# Patient Record
Sex: MALE | Race: OTHER | Employment: FULL TIME | URBAN - METROPOLITAN AREA
[De-identification: names, ages, dates, MRNs, and addresses within clinical notes are randomized per-mention and may not be internally consistent; named-entity substitution may affect disease eponyms.]

---

## 2017-10-03 ENCOUNTER — OFFICE VISIT (OUTPATIENT)
Dept: ORTHOPEDIC SURGERY | Age: 52
End: 2017-10-03

## 2017-10-03 VITALS
HEART RATE: 62 BPM | HEIGHT: 66 IN | DIASTOLIC BLOOD PRESSURE: 90 MMHG | SYSTOLIC BLOOD PRESSURE: 145 MMHG | WEIGHT: 145 LBS | BODY MASS INDEX: 23.3 KG/M2

## 2017-10-03 DIAGNOSIS — M75.41 SHOULDER IMPINGEMENT, RIGHT: Primary | ICD-10-CM

## 2017-10-03 PROCEDURE — 99203 OFFICE O/P NEW LOW 30 MIN: CPT | Performed by: ORTHOPAEDIC SURGERY

## 2017-10-03 NOTE — MR AVS SNAPSHOT
After Visit Summary             Venus Hum   10/3/2017 9:00 AM   Office Visit    Description:  Male : 1965   Provider:  Hayley Metcalf MD   Department:  [facility] and Future Appointments         Below is a list of your follow-up and future appointments. This may not be a complete list as you may have made appointments directly with providers that we are not aware of or your providers may have made some for you. Please call your providers to confirm appointments. It is important to keep your appointments. Please bring your current insurance card, photo ID, co-pay, and all medication bottles to your appointment. If self-pay, payment is expected at the time of service. Your To-Do List     Future Appointments Provider Department Dept Phone    10/24/2017 9:00 AM Hayley Mectalf MD [facility] [phone]    Please arrive 15 minutes prior to appointment time, bring insurance card and photo ID. Information from Your Visit        Department     Name Address Phone Fax    [address] [address] [phone]      Lyric Ioana Were Seen for:         Comments    Shoulder impingement, right   [[id number]]         Vital Signs     Height Weight Body Mass Index Smoking Status          5' 6\" (1.676 m) 145 lb (65.8 kg) 23.4 kg/m2 Never Smoker           Medications and Orders      Allergies           No Known Allergies      We Ordered/Performed the following           Ambulatory referral to Physical Therapy     Scheduling Instructions:    [place] [address]  [address] [facility]  Ph: [phone]    Comments: The patient can be scheduled with any member of the group, including the provider with the first available appointments.           Additional Information        Basic Information     Date Of Birth Sex Race Ethnicity Preferred Language 9. Click Sign Up. You can now view your medical record. Additional Information  If you have questions, please contact the physician practice where you receive care. Remember, [employer] ([employer]) is NOT to be used for urgent needs. For medical emergencies, dial 911. For questions regarding your [employer] account call [phone]. If you have a clinical question, please call your doctor's office.

## 2017-10-03 NOTE — PROGRESS NOTES
12 [facility]  History and Physical      Chief Complaint    Shoulder Pain (NP Right shoulder pain)      History of Present Illness:  Jhonny Saez is a 46 y.o. male Who presents with approximately 8-9 months of right shoulder pain. He denies any previous history of injury to his shoulder. He states that the pain has been coming on and off for several months. He describes pain as sometimes 0 out of 10 sometimes 5-6 out of 10. It is made worse by throwing, serving a tennis ball, any activity where he is to reach overhead. It is made better when he does not use it. He denies any numbness or tingling, or dysesthesias in his digits. He denies any strength weaknesses in his elbow and wrist or hand. He denies any sensation of a pop or any acute episodes of worse pain. Medical History:    Review of Systems   Musculoskeletal: Positive for joint pain. Right Shoulder Pain         Patient's medications, allergies, past medical, surgical, social and family histories were reviewed and updated as appropriate. History reviewed. No pertinent past medical history. Social History     Social History    Marital status: Unknown     Spouse name: N/A    Number of children: N/A    Years of education: N/A     Occupational History    Not on file. Social History Main Topics    Smoking status: Never Smoker    Smokeless tobacco: Never Used    Alcohol use Yes    Drug use: No    Sexual activity: Not on file     Other Topics Concern    Not on file     Social History Narrative    No narrative on file       No Known Allergies  No current outpatient prescriptions on file prior to visit. No current facility-administered medications on file prior to visit. Review of Systems:  Pertinent positives and negatives are noted in HPI above. Review of systems reviewed from Patient History Form, and available in the patient's chart under media tab.      Vital Signs:  Vitals:    10/03/17 0945   BP: (!) 145/90   Pulse:        General Exam:   Constitutional: Patient is adequately groomed with no evidence of malnutrition  DTRs: Deep tendon reflexes are intact  Mental Status: The patient is oriented to time, place and person. The patient's mood and affect are appropriate. Lymphatic: The lymphatic examination bilaterally reveals all areas to be without enlargement or induration. Vascular: Examination reveals no swelling or calf tenderness. Peripheral pulses are palpable and 2+. Neurological: The patient has good coordination. There is no weakness or sensory deficit. Upper Extremity:  Normal bulk and tone, symmetric. Visible Chas deformity on the right with evidence of ruptured long head of the biceps    ROM:     R  L  FF  130  150   EROT  35  45   IROT  L5  T10    PROM: Passive forward flexion is able to achieve equal to contralateral side, internal rotation with significant pain    Nontender to palpation Acromion, acromioclavicular joint, clavicle. Nontender over scapular spine and anterior biceps tendon. .  Tender to palpation Anterior rotator cuff, lateral to the acromion. .    Strength: 5/5 strength testing of deltoid, supraspinatus, infraspinatus, teres minor, and subsacpularis    Stability: negative sulcus sign, no evidence of instability. SILT R/U/M/Ax    Special tests: Positive Rojas and Neer impingement. Negative Yergensen's, negative liftoff, negative belly press, negative empty can. Imaging:     X-ray report that he brought in with him dated August 31 shows no evidence of osseous abnormality, no fracture or subluxation or dislocation. Also no evidence of soft tissue calcinosis no evidence of Hill-Sachs or glenoid injury. MRI was reviewed in detail today. Shows a mild amount of thickening of his coracoacromial ligament with evidence of inflammation and irritation on the bursal side of the rotator cuff.   He does not have a tino rotator cuff tear.  There is a SLAP tear evident. MRI reading does not note the biceps tendon injury. There is no evidence of rotator cuff tear. There is no evidence of osteochondral injury. Impression:  Encounter Diagnosis   Name Primary?  Shoulder impingement, right Yes             Plan:  We discussed pathophysiology and natural history of this with Mr. Annette Kumar at length, using images as well as models. It appears the primary source of his pain is some internal impingement that has resulted in some mild capsulitis due to his avoidance of certain ranges of motion over long period of time. We reviewed his MRI with him and showed him the areas of irritation and impingement. He can continue taking the anti-inflammatory medication which appears to be helping him. We can get him into physical therapy program that we think will significantly improve his rotator cuff strengthening and inflammation and irritation is going on around there. If he fails to respond to physical therapy, we will consider additional interventions including cortisone steroid injection to both his capsule as well as the subacromial space  If he fails to respond to all nonoperative treatment,we can discuss operative options with him. We do not think his biceps auto tenotomy as the source of his pain. If the inflammation under his acromion can improve we think he will get much better.           Antonia Cedillo  Fellow, [address]  Date:    10/3/2017

## 2017-10-04 ENCOUNTER — HOSPITAL ENCOUNTER (OUTPATIENT)
Dept: PHYSICAL THERAPY | Age: 52
Discharge: HOME | End: 2017-10-31
Admitting: ORTHOPAEDIC SURGERY

## 2017-10-04 NOTE — PLAN OF CARE
[facility] [address]  Phone [phone]  Fax [phone]      Physical Therapy Certification    Dear Referring Practitioner: Dr. Anirudh Toney,    We had the pleasure of evaluating the following patient for physical therapy services at [address] [place] [address]. A summary of our findings can be found in the initial assessment below. This includes our plan of care. If you have any questions or concerns regarding these findings, please do not hesitate to contact me at the office phone number checked above. Thank you for the referral.       Physician Signature:_______________________________Date:__________________  By signing above (or electronic signature), therapists plan is approved by physician      Patient: Rolly Camp   : 1965   MRN: [id number]  Referring Physician: Referring Practitioner: Dr. Anirudh Toney      Evaluation Date: 10/4/2017      Medical Diagnosis Information:  Diagnosis: R shoulder impingement - M75.41   Treatment Diagnosis: Decreased ROM; Decreased strength;Decreased endurance;Decreased high-level IADLs; Insurance information: PT Insurance Information: [employer]    Precautions/ Contra-indications: None   Latex Allergy:  [x]NO      []YES  Preferred Language for Healthcare:   [x]English       []other:    SUBJECTIVE: Patient stated complaint: he states his right shoulder has been sore/painful for months. He states it started limited activity over time. He saw his PCP, who gave him an anti-inflammatory. He states it did not help. He states he tried ice and a sports massage therapist about 3 times. He states she helped a little bit, but he didn't exactly trust her. He states he saw his PCP again, who referred him to a MRI and an ortho MD. He saw the ortho MD, who referred pt to PT.      CLOF: He states sleeping on his shoulder, reaching behind his back, and reaching across his body increases pain. He states he has started to avoid certain activity to stop irritating it. He states he is unable to serve tennis ball and throw ball with his children. He is unable to swing a golf club. He has difficulty with reaching up to put dishes away. Relevant Medical History:None   Functional Disability Index: UEFI     Pain Scale: 0/10, 8/10 at worst   Easing factors: anti-inflammatory, none   Provocative factors: listed above     Type: []Constant   [x]Intermittent  []Radiating []Localized []other:     Numbness/Tingling: none     Occupation/School: Banking    Hobbies - tennis, golf, football, baseball, independent strength training     Living Status/Prior Level of Function: Independent with ADLs, IADLs, and sports     OBJECTIVE:     ROM AROM  Comment    L R    Flexion 168 148 + pain     Abduction 183 151 + pain     ER 87 60 + pain    IR 61 40 + pain    Other(cervical)              Strength L R Comment   Flexion 4+/5 4+/5    Abduction 4/5 4-/5 + pain     ER 4/5 4-/5    IR 4+/5 4-/5 + pain     Supraspinatus      Upper Trap      Lower Trap 4-/5 3+/5 + pain    Mid Trap 4/5 4-/5 + min pain     Rhomboids 4+/5 4/5      Special Tests Results/Comment   Rojas-Milad Pos   Neers Neg   Speeds Neg   OBriens Neg   Other      Reflexes/Sensation:    [x]Dermatomes/Myotomes intact    [x]Reflexes equal and normal bilaterally   []Other:     Joint mobility:    []Normal    []Hypo   []Hyper    Palpation: No TTP     Functional Mobility/Transfers: WNL    Posture: WNL    Gait: (include devices/WB status): WNL                       [x] Patient history, allergies, meds reviewed. Medical chart reviewed. See intake form. Review Of Systems (ROS):  [x]Performed Review of systems (Integumentary, CardioPulmonary, Neurological) by intake and observation. Intake form has been scanned into medical record.  Patient has been instructed to contact their primary care physician regarding ROS issues if not restricted  Carrying, Moving and Handling Objects Goal Status (): 0 percent impaired, limited or restricted    ASSESSMENT:   Functional Impairments   []Noted spinal or UE joint hypomobility   []Noted spinal or UE joint hypermobility   [x]Decreased UE functional ROM   [x]Decreased UE functional strength   []Abnormal reflexes/sensation/myotomal/dermatomal deficits   [x]Decreased RC/scapular/core strength and neuromuscular control   []other:      Functional Activity Limitations (from functional questionnaire and intake)   []Reduced ability to tolerate prolonged functional positions   []Reduced ability or difficulty with changes of positions or transfers between positions   []Reduced ability to maintain good posture and demonstrate good body mechanics with sitting, bending, and lifting   [] Reduced ability or tolerance with driving and/or computer work   [x]Reduced ability to sleep   [x]Reduced ability to perform lifting, reaching, carrying tasks   []Reduced ability to tolerate impact through UE   [x]Reduced ability to reach behind back   []Reduced ability to  or hold objects   [x]Reduced ability to throw or toss an object   []other:      Participation Restrictions   [x]Reduced participation in self care activities   [x]Reduced participation in home management activities   []Reduced participation in work activities   []Reduced participation in social activities. [x]Reduced participation in sport / recreational activities. Classification:   []Signs/symptoms consistent with post-surgical status including decreased ROM, strength and function.   []Signs/symptoms consistent with joint sprain/strain   [x]Signs/symptoms consistent with shoulder impingement   []Signs/symptoms consistent with shoulder/elbow/wrist tendinopathy   []Signs/symptoms consistent with Rotator cuff tear   []Signs/symptoms consistent with labral tear   []Signs/symptoms consistent with postural dysfunction    []Signs/symptoms consistent with

## 2017-10-04 NOTE — PROGRESS NOTES
12 [facility]  History and Physical      Chief Complaint    Shoulder Pain (NP Right shoulder pain)      History of Present Illness:  Catarino Walden is a 46 y.o. male Who presents with approximately 8-9 months of right shoulder pain. He denies any previous history of injury to his shoulder. He states that the pain has been coming on and off for several months. He describes pain as sometimes 0 out of 10 sometimes 5-6 out of 10. It is made worse by throwing, serving a tennis ball, any activity where he is to reach overhead. It is made better when he does not use it. He denies any numbness or tingling, or dysesthesias in his digits. He denies any strength weaknesses in his elbow and wrist or hand. He denies any sensation of a pop or any acute episodes of worse pain. Medical History:    Review of Systems   Musculoskeletal: Positive for joint pain. Right Shoulder Pain                   [address] [facility]  History and Physical      Chief Complaint    Shoulder Pain (NP Right shoulder pain)      History of Present Illness:  Catarino Walden is a 46 y.o. male Who presents with approximately 8-9 months of right shoulder pain. He denies any previous history of injury to his shoulder. He states that the pain has been coming on and off for several months. He describes pain as sometimes 0 out of 10 sometimes 5-6 out of 10. It is made worse by throwing, serving a tennis ball, any activity where he is to reach overhead. It is made better when he does not use it. He denies any numbness or tingling, or dysesthesias in his digits. He denies any strength weaknesses in his elbow and wrist or hand. He denies any sensation of a pop or any acute episodes of worse pain. Medical History:    Review of Systems   Musculoskeletal: Positive for joint pain.         Right Shoulder Pain         Patient's medications, allergies, past medical, surgical, social and family histories were reviewed and updated as appropriate. History reviewed. No pertinent past medical history. Social History     Social History    Marital status: Unknown     Spouse name: N/A    Number of children: N/A    Years of education: N/A     Occupational History    Not on file. Social History Main Topics    Smoking status: Never Smoker    Smokeless tobacco: Never Used    Alcohol use Yes    Drug use: No    Sexual activity: Not on file     Other Topics Concern    Not on file     Social History Narrative    No narrative on file       No Known Allergies  No current outpatient prescriptions on file prior to visit. No current facility-administered medications on file prior to visit. Review of Systems:  Pertinent positives and negatives are noted in HPI above. Review of systems reviewed from Patient History Form, and available in the patient's chart under media tab. Vital Signs:  Vitals:    10/03/17 0945   BP: (!) 145/90   Pulse:        General Exam:   Constitutional: Patient is adequately groomed with no evidence of malnutrition  DTRs: Deep tendon reflexes are intact  Mental Status: The patient is oriented to time, place and person. The patient's mood and affect are appropriate. Lymphatic: The lymphatic examination bilaterally reveals all areas to be without enlargement or induration. Vascular: Examination reveals no swelling or calf tenderness. Peripheral pulses are palpable and 2+. Neurological: The patient has good coordination. There is no weakness or sensory deficit. Upper Extremity:  Normal bulk and tone, symmetric.   Visible Chas deformity on the right with evidence of ruptured long head of the biceps    ROM:     R  L  FF  130  150   EROT  35  45   IROT  L5  T10    PROM: Passive forward flexion is able to achieve equal to contralateral side, internal rotation with significant pain    Nontender to palpation Acromion, acromioclavicular joint, clavicle. Nontender over scapular spine and anterior biceps tendon. .  Tender to palpation Anterior rotator cuff, lateral to the acromion. .    Strength: 5/5 strength testing of deltoid, supraspinatus, infraspinatus, teres minor, and subsacpularis    Stability: negative sulcus sign, no evidence of instability. SILT R/U/M/Ax    Special tests: Positive Rojas and Neer impingement. Negative Yergensen's, negative liftoff, negative belly press, negative empty can. Imaging:     X-ray report that he brought in with him dated August 31 shows no evidence of osseous abnormality, no fracture or subluxation or dislocation. Also no evidence of soft tissue calcinosis no evidence of Hill-Sachs or glenoid injury. MRI was reviewed in detail today. Shows a mild amount of thickening of his coracoacromial ligament with evidence of inflammation and irritation on the bursal side of the rotator cuff. He does not have a tino rotator cuff tear. There is a SLAP tear evident. MRI reading does not note the biceps tendon injury. There is no evidence of rotator cuff tear. There is no evidence of osteochondral injury. Impression:  Encounter Diagnosis   Name Primary?  Shoulder impingement, right Yes             Plan:  We discussed pathophysiology and natural history of this with Mr. Noe Farrell at length, using images as well as models. It appears the primary source of his pain is some internal impingement that has resulted in some mild capsulitis due to his avoidance of certain ranges of motion over long period of time. We reviewed his MRI with him and showed him the areas of irritation and impingement. He can continue taking the anti-inflammatory medication which appears to be helping him. We can get him into physical therapy program that we think will significantly improve his rotator cuff strengthening and inflammation and irritation is going on around there.   If he fails to respond to physical therapy, we will consider additional interventions including cortisone steroid injection to both his capsule as well as the subacromial space  If he fails to respond to all nonoperative treatment,we can discuss operative options with him. We do not think his biceps auto tenotomy as the source of his pain. If the inflammation under his acromion can improve we think he will get much better. Grzegorz Rivas  Fellow, [address]  Date:    10/3/2017      Patient's medications, allergies, past medical, surgical, social and family histories were reviewed and updated as appropriate. History reviewed. No pertinent past medical history. Social History     Social History    Marital status: Unknown     Spouse name: N/A    Number of children: N/A    Years of education: N/A     Occupational History    Not on file. Social History Main Topics    Smoking status: Never Smoker    Smokeless tobacco: Never Used    Alcohol use Yes    Drug use: No    Sexual activity: Not on file     Other Topics Concern    Not on file     Social History Narrative    No narrative on file       No Known Allergies  No current outpatient prescriptions on file prior to visit. No current facility-administered medications on file prior to visit. Review of Systems:  Pertinent positives and negatives are noted in HPI above. Review of systems reviewed from Patient History Form, and available in the patient's chart under media tab. Vital Signs:  Vitals:    10/03/17 0945   BP: (!) 145/90   Pulse:        General Exam:   Constitutional: Patient is adequately groomed with no evidence of malnutrition  DTRs: Deep tendon reflexes are intact  Mental Status: The patient is oriented to time, place and person. The patient's mood and affect are appropriate. Lymphatic: The lymphatic examination bilaterally reveals all areas to be without enlargement or induration.   Vascular: source of his pain is some internal impingement that has resulted in some mild capsulitis due to his avoidance of certain ranges of motion over long period of time. We reviewed his MRI with him and showed him the areas of irritation and impingement. He can continue taking the anti-inflammatory medication which appears to be helping him. We can get him into physical therapy program that we think will significantly improve his rotator cuff strengthening and inflammation and irritation is going on around there. If he fails to respond to physical therapy, we will consider additional interventions including cortisone steroid injection to both his capsule as well as the subacromial space  If he fails to respond to all nonoperative treatment,we can discuss operative options with him. We do not think his biceps auto tenotomy as the source of his pain. If the inflammation under his acromion can improve we think he will get much better. Dalia Lea  Fellow, [address]  Date:    10/4/2017          I supervised my sports medicine fellow in the evaluation and development of a treatment plan  for this patient. I personally interviewed the patient and performed a physical examination. In addition, I discussed the patient's condition and treatment options with them. All of their questions were answered. I personally reviewed the patient's pain scale, review of systems, family history, social history, past medical history, allergies and medications. 13 point review of systems was collected today and is filed in the medical record. Greater than 50% of the visit was spent counseling the patient. Jarrett Pratt MD  Sports Medicine, Arthroscopic Knee and Shoulder Surgery    This dictation was performed with a verbal recognition program [facility]) and it was checked for errors.   It is possible that there are still dictated errors within this office note. If so, please bring any errors to my attention for an addendum. All efforts were made to ensure that this office note is accurate.

## 2017-10-06 ENCOUNTER — HOSPITAL ENCOUNTER (OUTPATIENT)
Dept: PHYSICAL THERAPY | Age: 52
Discharge: HOME | End: 2017-10-06
Admitting: ORTHOPAEDIC SURGERY

## 2017-10-06 NOTE — FLOWSHEET NOTE
The [address] [place] [address] Sports [facility], [address] 3360 [facility], [address] [place] [address]  Phone: [phone]   Fax:     [phone]    Physical Therapy Daily Treatment Note  Date:  10/6/2017    Patient Name:  Colby Maya    :  1965  MRN: [id number]  Restrictions/Precautions:    Medical/Treatment Diagnosis Information:  Diagnosis: R shoulder impingement - M75.41  Treatment Diagnosis: Decreased ROM; Decreased strength;Decreased endurance;Decreased high-level IADLs; Insurance/Certification information:  PT Insurance Information: [place]  Physician Information:  Referring Practitioner: Dr. Jacqueline Garcia of care signed (Y/N): Y    Date of Patient follow up with Physician:     G-Code (if applicable):      Date G-Code Applied: 10/4/17   PT G-Codes  Functional Assessment Tool Used: UEFI  Score: 20% limitation  Functional Limitation: Carrying, moving and handling objects  Carrying, Moving and Handling Objects Current Status (): At least 1 percent but less than 20 percent impaired, limited or restricted  Carrying, Moving and Handling Objects Goal Status (): 0 percent impaired, limited or restricted    Progress Note: []  Yes  [x]  No  Next due by: 17      Latex Allergy:  [x]NO      []YES  Preferred Language for Healthcare:   [x]English       []other:    Visit # Insurance Allowable   2 30     Pain level: 0/10 10/6     SUBJECTIVE:  States in the AM he is usually more stiff. He states he felt good leaving here the other day. HEP is going ok, but reports he wants to makes sure he is doing them correctly.  10/6    OBJECTIVE:   Observation:   Test measurements:      RESTRICTIONS/PRECAUTIONS: *None     Exercises/Interventions:   Exercises:  Exercise/Equipment Resistance/Repetitions Other comments   Stretching/PROM     Wand     Table Slides     Sleeper stretch 10 sec x 10  Added 10/   Pulleys 10 sec x 10 flex Added 10/   ER cane 10 sec x 10 supine Added 10/4 Isometrics     Retraction          Weight shift     Flexion Abduction External Rotation Internal Rotation Biceps     Triceps          PRE's     Flexion     Abduction     External Rotation     Internal Rotation     Shrugs     EXT     Reverse Flys     Serratus 10 x 3 #3  Added 10/6   Horizontal Abd with ER     Biceps     Triceps     Retraction          Cable Column/Theraband     External Rotation 10 x 3 GTB Added 10/6   Internal Rotation 10 x 3 Blue TB Added 10/6   Shrugs     Lats     Ext 10 x 3 GTB Added 10/6   Flex     Scapular Retraction 10 x 3 GTB Added 10/6   BIC     TRIC     PNF          Dynamic Stability                 Therapeutic Exercise and NMR EXR  [x] (53086) Provided verbal/tactile cueing for activities related to strengthening, flexibility, endurance, ROM  for improvements in scapular, scapulothoracic and UE control with self care, reaching, carrying, lifting, house/yardwork, driving/computer work.    [] (79551) Provided verbal/tactile cueing for activities related to improving balance, coordination, kinesthetic sense, posture, motor skill, proprioception  to assist with  scapular, scapulothoracic and UE control with self care, reaching, carrying, lifting, house/yardwork, driving/computer work. Therapeutic Activities:    [] (98651 or 37258) Provided verbal/tactile cueing for activities related to improving balance, coordination, kinesthetic sense, posture, motor skill, proprioception and motor activation to allow for proper function of scapular, scapulothoracic and UE control with self care, carrying, lifting, driving/computer work.      Home Exercise Program:    [x] (49997) Reviewed/Progressed HEP activities related to strengthening, flexibility, endurance, ROM of scapular, scapulothoracic and UE control with self care, reaching, carrying, lifting, house/yardwork, driving/computer work  [] (47562) Reviewed/Progressed HEP activities related to improving balance, coordination, kinesthetic sense, greater than or equal to 60 deg to allow for proper joint functioning as indicated by patients Functional Deficits. 3. Patient will demonstrate an increase in right shoulder (flex, ABD, IR, and ER) strength to 4+/5 to allow for proper functional mobility as indicated by patients Functional Deficits. 4. Patient will return to ADLs without increased symptoms or restriction. 5. Patient will return to tennis, golf, and throwing pain free. Progression Towards Functional goals:  [] Patient is progressing as expected towards functional goals listed. [] Progression is slowed due to complexities listed. [] Progression has been slowed due to co-morbidities. [x] Plan just implemented, too soon to assess goals progression  [] Other:     ASSESSMENT:  Pt emeli session well. He demonstrated increased strength, noted by increased resistance. He required VCs for proper form with shoulder ER stretch and resisted ER.   10/6    Treatment/Activity Tolerance:  [x] Patient tolerated treatment well [] Patient limited by fatique  [] Patient limited by pain  [] Patient limited by other medical complications  [] Other:     Patient education: Patient education on PT and plan of care including diagnosis, prognosis, treatment goals and options. Patient agrees with discussed POC and treatment and is aware of rehab process. Pt was also educated on clinic layout and use of modalities.  10/4    Prognosis: [x] Good [] Fair  [] Poor    Patient Requires Follow-up: [x] Yes  [] No    PLAN:   [] Continue per plan of care [] Alter current plan (see comments)  [x] Plan of care initiated [] Hold pending MD visit [] Discharge    Electronically signed by: Rahul Grossman PT, DPT

## 2017-10-10 ENCOUNTER — HOSPITAL ENCOUNTER (OUTPATIENT)
Dept: PHYSICAL THERAPY | Age: 52
Discharge: HOME | End: 2017-10-10
Admitting: ORTHOPAEDIC SURGERY

## 2017-10-10 NOTE — FLOWSHEET NOTE
The [address] [place] [address] Sports [facility], [address] 3360 [facility], [address] [place] [address]  Phone: [phone]   Fax:     [phone]    Physical Therapy Daily Treatment Note  Date:  10/10/2017    Patient Name:  Girish Morrison    :  1965  MRN: [id number]  Restrictions/Precautions:    Medical/Treatment Diagnosis Information:  Diagnosis: R shoulder impingement - M75.41  Treatment Diagnosis: Decreased ROM; Decreased strength;Decreased endurance;Decreased high-level IADLs; Insurance/Certification information:  PT Insurance Information: [place]  Physician Information:  Referring Practitioner: Dr. Vickie Sutton of care signed (Y/N): Y    Date of Patient follow up with Physician:     G-Code (if applicable):      Date G-Code Applied: 10/4/17   PT G-Codes  Functional Assessment Tool Used: UEFI  Score: 20% limitation  Functional Limitation: Carrying, moving and handling objects  Carrying, Moving and Handling Objects Current Status (): At least 1 percent but less than 20 percent impaired, limited or restricted  Carrying, Moving and Handling Objects Goal Status (): 0 percent impaired, limited or restricted    Progress Note: []  Yes  [x]  No  Next due by: 17      Latex Allergy:  [x]NO      []YES  Preferred Language for Healthcare:   [x]English       []other:    Visit # Insurance Allowable   3 30     Pain level: 4-5 /10 average  10/10     SUBJECTIVE:  States he continues to have pain, but his flexibility is getting better. He states he still has pain with certain movements.   10/10    OBJECTIVE:   Observation:   Test measurements:      RESTRICTIONS/PRECAUTIONS: None     Exercises/Interventions:   Exercises:  Exercise/Equipment Resistance/Repetitions Other comments   Stretching/PROM     Wand     Table Slides     Sleeper stretch 10 sec x 10  Added 10/4   Pulleys 10 sec x 10 flex Added 10/4   ER cane 10 sec x 10 supine Added 10/4        Isometrics     Retraction sense, posture, motor skill, proprioception of scapular, scapulothoracic and UE control with self care, reaching, carrying, lifting, house/yardwork, driving/computer work      Manual Treatments:  PROM / STM / Oscillations-Mobs:  G-I, II, III, IV (PA's, Inf., Post.)  [] (97026) Provided manual therapy to mobilize soft tissue/joints of cervical/CT, scapular GHJ and UE for the purpose of modulating pain, promoting relaxation,  increasing ROM, reducing/eliminating soft tissue swelling/inflammation/restriction, improving soft tissue extensibility and allowing for proper ROM for normal function with self care, reaching, carrying, lifting, house/yardwork, driving/computer work    Modalities:  Declined 10/10    Charges:  Timed Code Treatment Minutes: 45'    Total Treatment Minutes: 5:30 - 6:20 (50')        [] EVAL (LOW) 10482 (typically 20 minutes face-to-face)  [] EVAL (MOD) 29375 (typically 30 minutes face-to-face)  [] EVAL (HIGH) 26608 (typically 45 minutes face-to-face)  [] RE-EVAL     [x] QV(43948) x  2   [] IONTO  [x] NMR (08430) x  1   [] VASO  [] Manual (71700) x       [] Other:  [] TA x       [] Mech Traction (88270)  [] ES(attended) (78315)      [] ES (un) (07575):     GOALS:  Patient stated goal: lifting, tossing balls with kids, playing tennis and playing golf    Therapist goals for Patient:   Short Term Goals: To be achieved in: 2 weeks  1. Independent in HEP and progression per patient tolerance, in order to prevent re-injury. 2. Patient will have a decrease in pain to facilitate improvement in movement, function, and ADLs as indicated by Functional Deficits. Long Term Goals: To be achieved in: 4-6 weeks  1. Pt will have greater than or equal to 50% improvement on UEFI to assist with reaching prior level of function.    2. Patient will demonstrate increased right shoulder ROM to greater than or equal to 165 deg, ABD ROM to greater than or equal to 180 deg, ER ROM to greater than or equal to 85 deg, and IR ROM to greater than or equal to 60 deg to allow for proper joint functioning as indicated by patients Functional Deficits. 3. Patient will demonstrate an increase in right shoulder (flex, ABD, IR, and ER) strength to 4+/5 to allow for proper functional mobility as indicated by patients Functional Deficits. 4. Patient will return to ADLs without increased symptoms or restriction. 5. Patient will return to tennis, golf, and throwing pain free. Progression Towards Functional goals:  [] Patient is progressing as expected towards functional goals listed. [] Progression is slowed due to complexities listed. [] Progression has been slowed due to co-morbidities. [x] Plan just implemented, too soon to assess goals progression  [] Other:     ASSESSMENT:  Pt emeli session well. He emeli prone Ts, but had pain with prone Ys. He reported increased muscular fatigue with the addition of ball on the wall. 10/10    Treatment/Activity Tolerance:  [x] Patient tolerated treatment well [] Patient limited by fatique  [] Patient limited by pain  [] Patient limited by other medical complications  [] Other:     Patient education: Patient education on PT and plan of care including diagnosis, prognosis, treatment goals and options. Patient agrees with discussed POC and treatment and is aware of rehab process. Pt was also educated on clinic layout and use of modalities.  10/4    Prognosis: [x] Good [] Fair  [] Poor    Patient Requires Follow-up: [x] Yes  [] No    PLAN:   [x] Continue per plan of care [] Alter current plan (see comments)  [] Plan of care initiated [] Hold pending MD visit [] Discharge    Electronically signed by: Lorelei Faulkner PT, DPT

## 2017-10-12 ENCOUNTER — HOSPITAL ENCOUNTER (OUTPATIENT)
Dept: PHYSICAL THERAPY | Age: 52
Discharge: HOME | End: 2017-10-12
Admitting: ORTHOPAEDIC SURGERY

## 2017-10-12 NOTE — FLOWSHEET NOTE
The [address] [place] [address] Sports [facility], [address] [place] [address], [address] [place] [address]  Phone: [phone]   Fax:     [phone]    Physical Therapy Daily Treatment Note  Date:  10/12/2017    Patient Name:  Tere Beasley    :  1965  MRN: [id number]  Restrictions/Precautions:    Medical/Treatment Diagnosis Information:  Diagnosis: R shoulder impingement - M75.41  Treatment Diagnosis: Decreased ROM; Decreased strength;Decreased endurance;Decreased high-level IADLs; Insurance/Certification information:  PT Insurance Information: [place]  Physician Information:  Referring Practitioner: Dr. Marques Sauceda of care signed (Y/N): Y    Date of Patient follow up with Physician:     G-Code (if applicable):      Date G-Code Applied: 10/4/17   PT G-Codes  Functional Assessment Tool Used: UEFI  Score: 20% limitation  Functional Limitation: Carrying, moving and handling objects  Carrying, Moving and Handling Objects Current Status (): At least 1 percent but less than 20 percent impaired, limited or restricted  Carrying, Moving and Handling Objects Goal Status (): 0 percent impaired, limited or restricted    Progress Note: []  Yes  [x]  No  Next due by: 17      Latex Allergy:  [x]NO      []YES  Preferred Language for Healthcare:   [x]English       []other:    Visit # Insurance Allowable   4 30     Pain level: 3 /10 average  10/12     SUBJECTIVE:  States he is happy with his range of motion and how it has come along.  He states he had a little pain with ball on the wall with CCW. 10/12    OBJECTIVE:   Observation:   Test measurements:      RESTRICTIONS/PRECAUTIONS: None     Exercises/Interventions:   Exercises:  Exercise/Equipment Resistance/Repetitions Other comments   Stretching/PROM     Wand     Towel stretch 10 sec x 10  Added 10/12   Sleeper stretch 10 sec x 10  Added 10/4   Pulleys 10 sec x 10 flex Added 10   ER cane 10 sec x 10 supine Added 10/4 Isometrics     Retraction          Weight shift          PRE's     Flexion     Abduction     External Rotation     Internal Rotation     Shrugs     EXT     Reverse Flys     Serratus 10 x 3 #8 ^10/10   Horizontal Abd with ER     Biceps     Triceps     Retraction     Prone Ts 10 x 3 Added 10/10   Prone Ys 10 x 3 Added 10/10        Cable Column/Theraband     External Rotation 10 x 3 Blue TB ^10/12   Internal Rotation 10 x 3 Blue TB ^10/12   Shrugs     Lats     Ext 10 x 3 Blue TB ^10/12   Flex     Scapular Retraction 10 x 3 Blue TB ^10/12   BIC     TRIC     PNF          Dynamic Stability     Ball on the wall 10 x 3 each, 4-way, kickball Added 10/10   Body blade 10 sec x 10  Added 10/12     Therapeutic Exercise and NMR EXR  [x] (12537) Provided verbal/tactile cueing for activities related to strengthening, flexibility, endurance, ROM  for improvements in scapular, scapulothoracic and UE control with self care, reaching, carrying, lifting, house/yardwork, driving/computer work.    [] (86398) Provided verbal/tactile cueing for activities related to improving balance, coordination, kinesthetic sense, posture, motor skill, proprioception  to assist with  scapular, scapulothoracic and UE control with self care, reaching, carrying, lifting, house/yardwork, driving/computer work. Therapeutic Activities:    [] (57352 or 63654) Provided verbal/tactile cueing for activities related to improving balance, coordination, kinesthetic sense, posture, motor skill, proprioception and motor activation to allow for proper function of scapular, scapulothoracic and UE control with self care, carrying, lifting, driving/computer work.      Home Exercise Program:    [x] (03793) Reviewed/Progressed HEP activities related to strengthening, flexibility, endurance, ROM of scapular, scapulothoracic and UE control with self care, reaching, carrying, lifting, house/yardwork, driving/computer work  [] (83068) Reviewed/Progressed HEP activities related to improving balance, coordination, kinesthetic sense, posture, motor skill, proprioception of scapular, scapulothoracic and UE control with self care, reaching, carrying, lifting, house/yardwork, driving/computer work      Manual Treatments:  PROM / STM / Oscillations-Mobs:  G-I, II, III, IV (PA's, Inf., Post.)  [] (44287) Provided manual therapy to mobilize soft tissue/joints of cervical/CT, scapular GHJ and UE for the purpose of modulating pain, promoting relaxation,  increasing ROM, reducing/eliminating soft tissue swelling/inflammation/restriction, improving soft tissue extensibility and allowing for proper ROM for normal function with self care, reaching, carrying, lifting, house/yardwork, driving/computer work    Modalities:  Declined 10/10    Charges:  Timed Code Treatment Minutes: 40'    Total Treatment Minutes: 8:05 - 8:45 (40')       [] EVAL (LOW) 29308 (typically 20 minutes face-to-face)  [] EVAL (MOD) 74077 (typically 30 minutes face-to-face)  [] EVAL (HIGH) 95823 (typically 45 minutes face-to-face)  [] RE-EVAL     [x] EJ(10638) x  2   [] IONTO  [x] NMR (47396) x  1   [] VASO  [] Manual (10910) x       [] Other:  [] TA x       [] Mech Traction (18870)  [] ES(attended) (54466)      [] ES (un) (54307):     GOALS:  Patient stated goal: lifting, tossing balls with kids, playing tennis and playing golf    Therapist goals for Patient:   Short Term Goals: To be achieved in: 2 weeks  1. Independent in HEP and progression per patient tolerance, in order to prevent re-injury. 2. Patient will have a decrease in pain to facilitate improvement in movement, function, and ADLs as indicated by Functional Deficits. Long Term Goals: To be achieved in: 4-6 weeks  1. Pt will have greater than or equal to 50% improvement on UEFI to assist with reaching prior level of function.    2. Patient will demonstrate increased right shoulder ROM to greater than or equal to 165 deg, ABD ROM to greater than or equal to 180

## 2017-10-17 ENCOUNTER — HOSPITAL ENCOUNTER (OUTPATIENT)
Dept: PHYSICAL THERAPY | Age: 52
Discharge: HOME | End: 2017-10-17
Admitting: ORTHOPAEDIC SURGERY

## 2017-10-17 NOTE — FLOWSHEET NOTE
Isometrics     Retraction          Weight shift          PRE's     Flexion     Abduction     External Rotation     Internal Rotation     Shrugs     EXT     Reverse Flys     Serratus 10 x 3 #8 ^10/10   Horizontal Abd with ER     Biceps     Triceps     Retraction     Prone Ts 10 x 3 ^10/17   Prone Ys 10 x 3 ^10/17        Cable Column/Theraband     External Rotation 10 x 3 Blue TB ^10/12   Internal Rotation 10 x 3 Blue TB ^10/12   Shrugs     Lats     Ext 10 x 3 Blue TB ^10/12   Flex     Scapular Retraction 10 x 3 Black TB ^10/17   BIC     TRIC     PNF          Dynamic Stability     Ball on the wall 10 x 3 each, 4-way, kickball Added 10/10   Body blade 10 sec x 10 (ER/IR) Added 10/12     Therapeutic Exercise and NMR EXR  [x] (08407) Provided verbal/tactile cueing for activities related to strengthening, flexibility, endurance, ROM  for improvements in scapular, scapulothoracic and UE control with self care, reaching, carrying, lifting, house/yardwork, driving/computer work.    [] (49852) Provided verbal/tactile cueing for activities related to improving balance, coordination, kinesthetic sense, posture, motor skill, proprioception  to assist with  scapular, scapulothoracic and UE control with self care, reaching, carrying, lifting, house/yardwork, driving/computer work. Therapeutic Activities:    [] (09948 or 83055) Provided verbal/tactile cueing for activities related to improving balance, coordination, kinesthetic sense, posture, motor skill, proprioception and motor activation to allow for proper function of scapular, scapulothoracic and UE control with self care, carrying, lifting, driving/computer work.      Home Exercise Program:    [x] (66452) Reviewed/Progressed HEP activities related to strengthening, flexibility, endurance, ROM of scapular, scapulothoracic and UE control with self care, reaching, carrying, lifting, house/yardwork, driving/computer work  [] (91523) Reviewed/Progressed HEP activities related to improving balance, coordination, kinesthetic sense, posture, motor skill, proprioception of scapular, scapulothoracic and UE control with self care, reaching, carrying, lifting, house/yardwork, driving/computer work      Manual Treatments:  PROM / STM / Oscillations-Mobs:  G-I, II, III, IV (PA's, Inf., Post.)  [] (10144) Provided manual therapy to mobilize soft tissue/joints of cervical/CT, scapular GHJ and UE for the purpose of modulating pain, promoting relaxation,  increasing ROM, reducing/eliminating soft tissue swelling/inflammation/restriction, improving soft tissue extensibility and allowing for proper ROM for normal function with self care, reaching, carrying, lifting, house/yardwork, driving/computer work    Modalities:  Declined 10/17    Charges:  Timed Code Treatment Minutes: 45'    Total Treatment Minutes: 5:35 - 6:20 (45')       [] EVAL (LOW) 92325 (typically 20 minutes face-to-face)  [] EVAL (MOD) 50409 (typically 30 minutes face-to-face)  [] EVAL (HIGH) 19761 (typically 45 minutes face-to-face)  [] RE-EVAL     [x] UK(51588) x  2   [] IONTO  [x] NMR (05104) x  1   [] VASO  [] Manual (88261) x       [] Other:  [] TA x       [] Mech Traction (30884)  [] ES(attended) (72906)      [] ES (un) (52834):     GOALS:  Patient stated goal: lifting, tossing balls with kids, playing tennis and playing golf    Therapist goals for Patient:   Short Term Goals: To be achieved in: 2 weeks  1. Independent in HEP and progression per patient tolerance, in order to prevent re-injury. 2. Patient will have a decrease in pain to facilitate improvement in movement, function, and ADLs as indicated by Functional Deficits. Long Term Goals: To be achieved in: 4-6 weeks  1. Pt will have greater than or equal to 50% improvement on UEFI to assist with reaching prior level of function.    2. Patient will demonstrate increased right shoulder ROM to greater than or equal to 165 deg, ABD ROM to greater than or equal to 180 deg, ER ROM to greater than or equal to 85 deg, and IR ROM to greater than or equal to 60 deg to allow for proper joint functioning as indicated by patients Functional Deficits. 3. Patient will demonstrate an increase in right shoulder (flex, ABD, IR, and ER) strength to 4+/5 to allow for proper functional mobility as indicated by patients Functional Deficits. 4. Patient will return to ADLs without increased symptoms or restriction. 5. Patient will return to tennis, golf, and throwing pain free. Progression Towards Functional goals:  [] Patient is progressing as expected towards functional goals listed. [] Progression is slowed due to complexities listed. [] Progression has been slowed due to co-morbidities. [x] Plan just implemented, too soon to assess goals progression  [] Other:     ASSESSMENT:  Pt tolerated most activities. Both Serratus pushes and CW ball on the wall elicited discomfort. However, he was able to increase resistance for TB row and Y's/T's. The patient reported decreased pain after stretching and beginning exercises, but says it \"wears off\". The patient appears to be motivated, but instructed to not perform excessive home exercises due to the possibility of fatigues. 10/17    Treatment/Activity Tolerance:  [x] Patient tolerated treatment well [] Patient limited by fatique  [] Patient limited by pain  [] Patient limited by other medical complications  [] Other:     Patient education: Patient education on PT and plan of care including diagnosis, prognosis, treatment goals and options. Patient agrees with discussed POC and treatment and is aware of rehab process. Pt was also educated on clinic layout and use of modalities.  10/4    Prognosis: [x] Good [] Fair  [] Poor    Patient Requires Follow-up: [x] Yes  [] No    PLAN:   [x] Continue per plan of care [] Alter current plan (see comments)  [] Plan of care initiated [] Hold pending MD visit [] Discharge    Electronically signed by: Ady Mcclelland Michael Narayan, ASHLEY Myers, student physical therapist  Therapist was present, directed the patient's care, made skilled judgement, and was responsible for assessment and treatment of the patient.

## 2017-10-19 ENCOUNTER — HOSPITAL ENCOUNTER (OUTPATIENT)
Dept: PHYSICAL THERAPY | Age: 52
Discharge: HOME | End: 2017-10-19
Admitting: ORTHOPAEDIC SURGERY

## 2017-10-19 NOTE — FLOWSHEET NOTE
10/4   Pulleys 10 sec x 10 flex Added 10/4   ER cane 10 sec x 10 supine Added 10/4        Isometrics     Retraction          Weight shift          PRE's     Flexion     Abduction     External Rotation     Internal Rotation     Shrugs     EXT     Reverse Flys     Serratus 10 x 3 #8 ^10/10   Horizontal Abd with ER     Biceps     Triceps     Retraction     Prone Ts 10 x 3 #1 ^10/17   Prone Ys 10 x 3 #1 ^10/17   Prone row+ER on T-ball  10x 1 #4 Added 10/19   Cable Column/Theraband     External Rotation 10 x 3 Blue TB ^10/12   Internal Rotation 10 x 3 Blue TB ^10/12   Shrugs     Lats     Ext 10 x 3 Black TB ^10/19   Flex     Scapular Retraction 10 x 3 Black TB ^10/17   BIC     TRIC     PNF          Dynamic Stability     Ball on the wall 10 x 3 each, 4-way, kickball Added 10/10   Body blade 10 sec x 10 (ER/IR) Added 10/12     Therapeutic Exercise and NMR EXR  [x] (49963) Provided verbal/tactile cueing for activities related to strengthening, flexibility, endurance, ROM  for improvements in scapular, scapulothoracic and UE control with self care, reaching, carrying, lifting, house/yardwork, driving/computer work.    [] (18841) Provided verbal/tactile cueing for activities related to improving balance, coordination, kinesthetic sense, posture, motor skill, proprioception  to assist with  scapular, scapulothoracic and UE control with self care, reaching, carrying, lifting, house/yardwork, driving/computer work. Therapeutic Activities:    [] (71211 or 01589) Provided verbal/tactile cueing for activities related to improving balance, coordination, kinesthetic sense, posture, motor skill, proprioception and motor activation to allow for proper function of scapular, scapulothoracic and UE control with self care, carrying, lifting, driving/computer work.      Home Exercise Program:    [x] (94991) Reviewed/Progressed HEP activities related to strengthening, flexibility, endurance, ROM of scapular, scapulothoracic and UE control with self care, reaching, carrying, lifting, house/yardwork, driving/computer work  [] (58299) Reviewed/Progressed HEP activities related to improving balance, coordination, kinesthetic sense, posture, motor skill, proprioception of scapular, scapulothoracic and UE control with self care, reaching, carrying, lifting, house/yardwork, driving/computer work      Manual Treatments:  PROM / STM / Oscillations-Mobs:  G-I, II, III, IV (PA's, Inf., Post.)  [] (35819) Provided manual therapy to mobilize soft tissue/joints of cervical/CT, scapular GHJ and UE for the purpose of modulating pain, promoting relaxation,  increasing ROM, reducing/eliminating soft tissue swelling/inflammation/restriction, improving soft tissue extensibility and allowing for proper ROM for normal function with self care, reaching, carrying, lifting, house/yardwork, driving/computer work    Modalities:  [facility] 10/19    Charges:  Timed Code Treatment Minutes: 50'    Total Treatment Minutes: 5:30 - 6:20 (50')       [] EVAL (LOW) 81963 (typically 20 minutes face-to-face)  [] EVAL (MOD) 55817 (typically 30 minutes face-to-face)  [] EVAL (HIGH) 423 8935 (typically 45 minutes face-to-face)  [] RE-EVAL     [x] VO(34447) x  2   [] IONTO  [x] NMR (24189) x  1   [] VASO  [] Manual ([id number]) x       [] Other:  [] TA x       [] Mech Traction (32219)  [] ES(attended) (95655)      [] ES (un) (17415):     GOALS:  Patient stated goal: lifting, tossing balls with kids, playing tennis and playing golf    Therapist goals for Patient:   Short Term Goals: To be achieved in: 2 weeks  1. Independent in HEP and progression per patient tolerance, in order to prevent re-injury. 2. Patient will have a decrease in pain to facilitate improvement in movement, function, and ADLs as indicated by Functional Deficits. Long Term Goals: To be achieved in: 4-6 weeks  1.  Pt will have greater than or equal to 50% improvement on UEFI to assist with reaching prior level of function. 2. Patient will demonstrate increased right shoulder ROM to greater than or equal to 165 deg, ABD ROM to greater than or equal to 180 deg, ER ROM to greater than or equal to 85 deg, and IR ROM to greater than or equal to 60 deg to allow for proper joint functioning as indicated by patients Functional Deficits. 3. Patient will demonstrate an increase in right shoulder (flex, ABD, IR, and ER) strength to 4+/5 to allow for proper functional mobility as indicated by patients Functional Deficits. 4. Patient will return to ADLs without increased symptoms or restriction. 5. Patient will return to tennis, golf, and throwing pain free. Progression Towards Functional goals:  [] Patient is progressing as expected towards functional goals listed. [] Progression is slowed due to complexities listed. [] Progression has been slowed due to co-morbidities. [x] Plan just implemented, too soon to assess goals progression  [] Other:     ASSESSMENT:  Pt performed all exercises with good recall and minimal discomfort. The patient inquired about performing push-ups and pull-ups at home for exercise. His push-up was assessed and cued in a standing position, angled, against a table. The patient reported discomfort and was advised to not perform exercise at home until pain was reduced. Pull-ups, due to humeral positioning, was recommended to be performed as long as it is pain free. 10/19    Treatment/Activity Tolerance:  [x] Patient tolerated treatment well [] Patient limited by fatique  [] Patient limited by pain  [] Patient limited by other medical complications  [] Other:     Patient education: Patient education on PT and plan of care including diagnosis, prognosis, treatment goals and options. Patient agrees with discussed POC and treatment and is aware of rehab process. Pt was also educated on clinic layout and use of modalities.  10/4    Prognosis: [x] Good [] Fair  [] Poor    Patient Requires Follow-up: [x]

## 2017-11-01 ENCOUNTER — HOSPITAL ENCOUNTER (OUTPATIENT)
Dept: PHYSICAL THERAPY | Age: 52
Discharge: HOME | End: 2017-11-30
Attending: ORTHOPAEDIC SURGERY | Admitting: ORTHOPAEDIC SURGERY

## 2017-11-03 ENCOUNTER — OFFICE VISIT (OUTPATIENT)
Dept: ORTHOPEDIC SURGERY | Age: 52
End: 2017-11-03

## 2017-11-03 VITALS
DIASTOLIC BLOOD PRESSURE: 84 MMHG | SYSTOLIC BLOOD PRESSURE: 120 MMHG | WEIGHT: 145 LBS | HEIGHT: 66 IN | HEART RATE: 66 BPM | BODY MASS INDEX: 23.3 KG/M2

## 2017-11-03 DIAGNOSIS — M75.41 SHOULDER IMPINGEMENT SYNDROME, RIGHT: Primary | ICD-10-CM

## 2017-11-03 DIAGNOSIS — M75.01 ADHESIVE CAPSULITIS OF RIGHT SHOULDER: ICD-10-CM

## 2017-11-03 PROCEDURE — 99213 OFFICE O/P EST LOW 20 MIN: CPT | Performed by: ORTHOPAEDIC SURGERY

## 2017-11-03 RX ORDER — DICLOFENAC SODIUM 75 MG/1
75 TABLET, DELAYED RELEASE ORAL 2 TIMES DAILY
Qty: 60 TABLET | Refills: 1 | Status: SHIPPED | OUTPATIENT
Start: 2017-11-03

## 2017-11-03 NOTE — PROGRESS NOTES
HPI: Jimmy Card is pleasant 46 y.o. male presenting today follow-up of his right shoulder. He was last seen approximately one month ago at which time it was thought his primary source of pain was some internal impingement that resulted in mild capsulitis due to avoidance of certain range of motion over a long period of time. He was treated with over-the-counter anti-inflammatory medication in conjunction with physical therapy. About 2 weeks ago he had a flareup of symptoms after throwing. Although he continues to have some pain with certain activities such as throwing he is encouraged by his improvement thus far. His sleep and flexibility have both improved. He would be open to continuing with the current treatment plan and/or perhaps taking a slightly more aggressive conservative approach. At rest his pain is a 0/10 and with activity it gets up to a 78/10. He denies any numbness and/or tingling. Adhesive capsulitis aggressive ROM        DX:   Encounter Diagnoses   Name Primary?  Shoulder impingement syndrome, right Yes    Adhesive capsulitis of right shoulder              PLAN: Previous MRI results were re-reviewed. Mr. Martínez Anderson has seen significant improvement with physical therapy and an OTC anti-inflammtory, but continues to have residual tightness in his right shoulder. The recommended plan at this time is to continue in physical therapy beginning more aggressive ROM exercises in conjunction with a prescribed anti-inflammatory. Mr. Martínez Anderson would like to proceed with the recommended plan. A new physical therapy letter and order were documented in [facility] and a prescription for Diclofenac was sent electronically into his pharmacy, please see discussion below. If he does not see satisfactory improvement over the next 1.5 months further interventions such as a corticosteroid injection may be considered. Mr. Martínez Anderson should follow-up in 6 weeks and/or as needed.  All questions were answered to patient's satisfaction and was encouraged to call with any further questions or concerns. Jalil Simpson is in full agreement with the plan. Discussion:    The patient was advised that NSAID-type medications have several potential side effects that include: gastrointestinal irritation including hemorrhage, renal injury, as well as an increased risk for heart attack and stroke. The patient was asked to take the medication with food and to stop if there is any symptoms of GI upset, including heartburn, nausea, increased gas or diarrhea. I asked the patient to contact their medical provider for vomiting, abdominal pain or black/bloody stools. The patient should have renal function testing per his medical provider periodically if the medication is taken on a regular basis. The patient should be alert for any swelling in the lower extremities and should stop taking the medication immediately and contact their medical provider should this occur. In addition, the patient should stop taking the medication immediately and contact their medical provider should there be any shortness of breath, fatigue and be evaluated in an emergency facility for any chest pain. The patient expresses understanding of these issues and questions were answered. Ricco Sanchez PA-C  11/3/2017     During this examination, I, Ricco Sanchez PA-C, functioned as a scribe for Dr. Edgar Lorenzana. The history taking and physical examination were performed by Dr. Edgar Lorenzana. All counseling during the appointment was performed between the patient and Dr. Edgar Lorenzana. 11/3/2017 9:15 AM    This dictation was performed with a verbal recognition program (DRAGON) and it was checked for errors. It is possible that there are still dictated errors within this office note. If so, please bring any errors to my attention for an addendum. All efforts were made to ensure that this office note is accurate.

## 2017-11-04 NOTE — PROGRESS NOTES
GRACIA Levy  11/4/2017     During this examination, I, Vibha Roa PA-C, functioned as a scribe for Dr. Edgar Gibbs. The history taking and physical examination were performed by Dr. Edgar Gibbs. All counseling during the appointment was performed between the patient and Dr. Edgar Gibbs. 11/4/2017 8:45 AM    This dictation was performed with a verbal recognition program (DRAGON) and it was checked for errors. It is possible that there are still dictated errors within this office note. If so, please bring any errors to my attention for an addendum. All efforts were made to ensure that this office note is accurate. Greater than 50% of the visit was spent counseling the patient. I personally reviewed the patient's pain scale, review of systems, family history, social history, past medical history, allergies and medications. 13 point review of systems was collected and reviewed today. It is noncontributory. I personally performed the services described in this documentation and scribed by Vibha Roa PA-C. Jennifer Roberts MD  Sports Medicine, Arthroscopic Knee and Shoulder Surgery    This dictation was performed with a verbal recognition program [facility]) and it was checked for errors. It is possible that there are still dictated errors within this office note. If so, please bring any errors to my attention for an addendum. All efforts were made to ensure that this office note is accurate.

## 2017-11-08 ENCOUNTER — HOSPITAL ENCOUNTER (OUTPATIENT)
Dept: PHYSICAL THERAPY | Age: 52
Discharge: HOME | End: 2017-11-08
Admitting: ORTHOPAEDIC SURGERY

## 2017-11-08 NOTE — PLAN OF CARE
[facility], [address], [address] [place] [address]  Phone: [phone]   Fax:     [phone]     Physical Therapy Re-Certification Plan of Care    Dear Dr. Deisy Galdamez,    We had the pleasure of treating the following patient for physical therapy services at [address] [place] [address]. A summary of our findings can be found in the updated assessment below. This includes our plan of care. If you have any questions or concerns regarding these findings, please do not hesitate to contact me at the office phone number checked above. Thank you for the referral.     Physician Signature:________________________________Date:__________________  By signing above (or electronic signature), therapists plan is approved by physician      Overall Response to Treatment:   [x]Patient is responding well to treatment and improvement is noted with regards  to goals   []Patient should continue to improve in reasonable time if they continue HEP   []Patient has plateaued and is no longer responding to skilled PT intervention    []Patient is getting worse and would benefit from return to referring MD   []Patient unable to adhere to initial POC   [x]Other:Pt demonstrated improved active shoulder ROM and strength, with greatest deficit with shoulder IR. He continues to have pain at end range and with resistance. He demonstrates improved scapular strength, but requires VCs for proper activation with exercises. Recommend pt continue with 1x/ week to gain full ROM and progress strength training to return to PLOF.      Date range of previous POC Visits: 10/4/17 - 17    Total Visits: 7        Physical Therapy Daily Treatment Note  Date:  2017    Patient Name:  Adam Rae    :  1965  MRN: [id number]  Restrictions/Precautions:    Medical/Treatment Diagnosis Information:  Diagnosis: R shoulder impingement - M75.41  Treatment to prevent re-injury. - MET   2. Patient will have a decrease in pain to facilitate improvement in movement, function, and ADLs as indicated by Functional Deficits. - MET     Long Term Goals: To be achieved in: 4-6 weeks  1. Pt will have greater than or equal to 50% improvement on UEFI to assist with reaching prior level of function.  - MET   2. Patient will demonstrate increased right shoulder ROM to greater than or equal to 165 deg, ABD ROM to greater than or equal to 180 deg, ER ROM to greater than or equal to 85 deg, and IR ROM to greater than or equal to 60 deg to allow for proper joint functioning as indicated by patients Functional Deficits. - Progressing towards  3. Patient will demonstrate an increase in right shoulder (flex, ABD, IR, and ER) strength to 4+/5 to allow for proper functional mobility as indicated by patients Functional Deficits. - Progressing towards  4. Patient will return to ADLs without increased symptoms or restriction. - Progressing towards  5. Patient will return to tennis, golf, and throwing pain free. - Progressing towards    Progression Towards Functional goals:  [x] Patient is progressing as expected towards functional goals listed. [] Progression is slowed due to complexities listed. [] Progression has been slowed due to co-morbidities. [] Plan just implemented, too soon to assess goals progression  [] Other:     ASSESSMENT:  Pt demonstrated improved active shoulder ROM and strength, with greatest deficit with shoulder IR. He continues to have pain at end range and with resistance. He demonstrates improved scapular strength, but requires VCs for proper activation with exercises. Recommend pt continue with 1x/ week to gain full ROM and progress strength training to return to PLOF.  11/8    Treatment/Activity Tolerance:  [x] Patient tolerated treatment well [] Patient limited by fatique  [] Patient limited by pain  [] Patient limited by other medical complications  [] Other: Patient education: Patient education on PT and plan of care including diagnosis, prognosis, treatment goals and options. Patient agrees with discussed POC and treatment and is aware of rehab process. Pt was also educated on clinic layout and use of modalities.  10/4    Prognosis: [x] Good [] Fair  [] Poor    Patient Requires Follow-up: [x] Yes  [] No    PLAN: 1x/ week for 4 - 6 weeks (11/8 - 12/20)  [x] Continue per plan of care [] Alter current plan (see comments)  [] Plan of care initiated [] Hold pending MD visit [] Discharge    Electronically signed by: Greenbox Record PT, DPT

## 2017-11-15 ENCOUNTER — HOSPITAL ENCOUNTER (OUTPATIENT)
Dept: PHYSICAL THERAPY | Age: 52
Discharge: HOME | End: 2017-11-15
Admitting: ORTHOPAEDIC SURGERY

## 2017-11-15 NOTE — FLOWSHEET NOTE
Trap 4-/5 4-/5      Mid Trap 4/5 4/5     Rhomboids 4+/5 4+/5        Special Tests Results/Comment   Rojas-Milad Pos   Neers Neg   Speeds Neg   OBriens Neg   Other         Joint mobility:               []Normal               [x]Hypo              []Hyper     RESTRICTIONS/PRECAUTIONS: None     Exercises/Interventions:   Exercises:  Exercise/Equipment Resistance/Repetitions Other comments   Stretching/PROM     Wand     Towel stretch 10 sec x 10  Added 10/12   Sleeper stretch 10 sec x 10  Added 10/4   Pulleys 10 sec x 10 flex Added 10/4   ER cane 10 sec x 10 supine Added 10/4        Isometrics     Retraction          Weight shift          PRE's     Flexion     Abduction     External Rotation     Internal Rotation     Shrugs     EXT     Reverse Flys     Serratus Horizontal Abd with ER     Biceps     Triceps     Retraction     Prone Ts 10 x 3 #3 on T-ball ^11/8   Prone Ys 10 x 3 #3 on T-ball ^11/8   Prone row+ER on T-ball  10x 1 #3 Added 10/19   Cable Column/Theraband     External Rotation 10 x 3 Black TB ^11/8   Internal Rotation 10 x 3 Black TB ^11/8   Shrugs     Lats     Ext 10 x 3 Black TB ^11/8   Flex     Scapular Retraction 10 x 3 Black TB ^11/8   BIC     TRIC     PNF          Dynamic Stability     Ball on the wall 10 x 3 each, 4-way, 2lb Added 10/10   Body blade 10 sec x 10 (ER/IR) Added 10/12     Therapeutic Exercise and NMR EXR  [x] (35732) Provided verbal/tactile cueing for activities related to strengthening, flexibility, endurance, ROM  for improvements in scapular, scapulothoracic and UE control with self care, reaching, carrying, lifting, house/yardwork, driving/computer work.    [] (85293) Provided verbal/tactile cueing for activities related to improving balance, coordination, kinesthetic sense, posture, motor skill, proprioception  to assist with  scapular, scapulothoracic and UE control with self care, reaching, carrying, lifting, house/yardwork, driving/computer work.     Therapeutic Activities: [] (23410 or 92020) Provided verbal/tactile cueing for activities related to improving balance, coordination, kinesthetic sense, posture, motor skill, proprioception and motor activation to allow for proper function of scapular, scapulothoracic and UE control with self care, carrying, lifting, driving/computer work. Home Exercise Program:    [x] (56420) Reviewed/Progressed HEP activities related to strengthening, flexibility, endurance, ROM of scapular, scapulothoracic and UE control with self care, reaching, carrying, lifting, house/yardwork, driving/computer work  [] (46082) Reviewed/Progressed HEP activities related to improving balance, coordination, kinesthetic sense, posture, motor skill, proprioception of scapular, scapulothoracic and UE control with self care, reaching, carrying, lifting, house/yardwork, driving/computer work      Manual Treatments:  PROM / STM / Oscillations-Mobs:  G-I, II, III, IV (PA's, Inf., Post.)  [x] (28703) Provided manual therapy to mobilize soft tissue/joints of cervical/CT, scapular GHJ and UE for the purpose of modulating pain, promoting relaxation,  increasing ROM, reducing/eliminating soft tissue swelling/inflammation/restriction, improving soft tissue extensibility and allowing for proper ROM for normal function with self care, reaching, carrying, lifting, house/yardwork, driving/computer work    Manual - shoulder flex, ABD, IR, and ER stretching, shoulder mobilizations Grade III-IV, Thoracic PA mobilizations Grade III-V - 10' 11/15     Modalities:   Ice to go 11/15    Charges:  Timed Code Treatment Minutes: 50'    Total Treatment Minutes: 7:00 - 8:00 (60')       [] EVAL (LOW) 45528 (typically 20 minutes face-to-face)  [] EVAL (MOD) 77232 (typically 30 minutes face-to-face)  [] EVAL (HIGH) 33375 (typically 45 minutes face-to-face)  [] RE-EVAL     [x] VE(26812) x  1   [] IONTO  [] NMR (13857) x      [] VASO  [x] Manual (87428) x  1    [] Other:  [x] TA x  1    [] [facility] Traction (32431)  [] ES(attended) (75938)      [] ES (un) (80285):     GOALS: 11/8  Patient stated goal: lifting, tossing balls with kids, playing tennis and playing golf - Progressing towards    Therapist goals for Patient:   Short Term Goals: To be achieved in: 2 weeks  1. Independent in HEP and progression per patient tolerance, in order to prevent re-injury. - MET   2. Patient will have a decrease in pain to facilitate improvement in movement, function, and ADLs as indicated by Functional Deficits. - MET     Long Term Goals: To be achieved in: 4-6 weeks  1. Pt will have greater than or equal to 50% improvement on UEFI to assist with reaching prior level of function.  - MET   2. Patient will demonstrate increased right shoulder ROM to greater than or equal to 165 deg, ABD ROM to greater than or equal to 180 deg, ER ROM to greater than or equal to 85 deg, and IR ROM to greater than or equal to 60 deg to allow for proper joint functioning as indicated by patients Functional Deficits. - Progressing towards  3. Patient will demonstrate an increase in right shoulder (flex, ABD, IR, and ER) strength to 4+/5 to allow for proper functional mobility as indicated by patients Functional Deficits. - Progressing towards  4. Patient will return to ADLs without increased symptoms or restriction. - Progressing towards  5. Patient will return to tennis, golf, and throwing pain free. - Progressing towards    Progression Towards Functional goals:  [x] Patient is progressing as expected towards functional goals listed. [] Progression is slowed due to complexities listed. [] Progression has been slowed due to co-morbidities. [] Plan just implemented, too soon to assess goals progression  [] Other:     ASSESSMENT:  The patient tolerated all exercises, but did mention some of them not being challenging enough. The prone Y's and T's were changed to a thera ball in order to challenge dynamic stability.  The patient responded well to manual treatment and was continued today. 11/15    Treatment/Activity Tolerance:  [x] Patient tolerated treatment well [] Patient limited by fatique  [] Patient limited by pain  [] Patient limited by other medical complications  [] Other:     Patient education: Patient education on PT and plan of care including diagnosis, prognosis, treatment goals and options. Patient agrees with discussed POC and treatment and is aware of rehab process. Pt was also educated on clinic layout and use of modalities. 10/4    Prognosis: [x] Good [] Fair  [] Poor    Patient Requires Follow-up: [x] Yes  [] No    PLAN: 1x/ week for 4 - 6 weeks (11/8 - 12/20)  [x] Continue per plan of care [] Alter current plan (see comments)  [] Plan of care initiated [] Hold pending MD visit [] Discharge    Electronically signed by: Arnulfo Tate PT, DPT    Shelbi Guerra, student physical therapist  Therapist was present, directed the patient's care, made skilled judgement, and was responsible for assessment and treatment of the patient.

## 2017-11-21 ENCOUNTER — HOSPITAL ENCOUNTER (OUTPATIENT)
Dept: PHYSICAL THERAPY | Age: 52
Discharge: HOME | End: 2017-11-21
Admitting: ORTHOPAEDIC SURGERY

## 2017-11-21 NOTE — FLOWSHEET NOTE
Special Tests Results/Comment   Rojas-Milad Pos   Neers Neg   Speeds Neg   OBriens Neg   Other         Joint mobility:               []Normal               [x]Hypo              []Hyper     RESTRICTIONS/PRECAUTIONS: None     Exercises/Interventions:   Exercises:  Exercise/Equipment Resistance/Repetitions Other comments   Stretching/PROM     Wand     Towel stretch 10 sec x 10  Added 10/12   Sleeper stretch 10 sec x 10  Added 10/4   Pulleys 10 sec x 10 flex Added 10/4   ER cane 10 sec x 10 supine Added 10/4   Thoracic extension 10 x 10 on stool Added 11/21   Isometrics     Retraction          Weight shift          PRE's     Flexion     Abduction     External Rotation     Internal Rotation     Shrugs     EXT     Reverse Flys     Serratus Horizontal Abd with ER     Biceps     Triceps     Retraction     Prone Ts 10 x 3 #3 on T-ball ^11/8   Prone Ys 10 x 3 #3 on T-ball ^11/8   Prone row+ER on T-ball  10x 1 #3 Added 10/19   Cable Column/Theraband     External Rotation 10 x 3 TB at 90 deg abd ^11/21   ER OSI 20 sec x 4 Added 11/21   Internal Rotation 10 x 3 TB at 90 deg abd ^11/21   Shrugs     Lats     Ext 10 x 3 Silver TB ^11/21   Flex     Scapular Retraction 10 x 3 Silver TB ^11/21   BIC     TRIC     PNF          Dynamic Stability     Ball on the wall 10 x 3 each, 4-way, 2lb Added 10/10   Body blade  Added 10/12   Single arm plank on table 10 x 2 reach under Added 11/21          Therapeutic Exercise and NMR EXR  [x] (23677) Provided verbal/tactile cueing for activities related to strengthening, flexibility, endurance, ROM  for improvements in scapular, scapulothoracic and UE control with self care, reaching, carrying, lifting, house/yardwork, driving/computer work.    [] (56760) Provided verbal/tactile cueing for activities related to improving balance, coordination, kinesthetic sense, posture, motor skill, proprioception  to assist with  scapular, scapulothoracic and UE control with self care, reaching, carrying, lifting, house/yardwork, driving/computer work. Therapeutic Activities:    [] (92457 or 65653) Provided verbal/tactile cueing for activities related to improving balance, coordination, kinesthetic sense, posture, motor skill, proprioception and motor activation to allow for proper function of scapular, scapulothoracic and UE control with self care, carrying, lifting, driving/computer work.      Home Exercise Program:    [x] (85537) Reviewed/Progressed HEP activities related to strengthening, flexibility, endurance, ROM of scapular, scapulothoracic and UE control with self care, reaching, carrying, lifting, house/yardwork, driving/computer work  [] (33661) Reviewed/Progressed HEP activities related to improving balance, coordination, kinesthetic sense, posture, motor skill, proprioception of scapular, scapulothoracic and UE control with self care, reaching, carrying, lifting, house/yardwork, driving/computer work      Manual Treatments:  PROM / STM / Oscillations-Mobs:  G-I, II, III, IV (PA's, Inf., Post.)  [x] (97613) Provided manual therapy to mobilize soft tissue/joints of cervical/CT, scapular GHJ and UE for the purpose of modulating pain, promoting relaxation,  increasing ROM, reducing/eliminating soft tissue swelling/inflammation/restriction, improving soft tissue extensibility and allowing for proper ROM for normal function with self care, reaching, carrying, lifting, house/yardwork, driving/computer work    Manual -  shoulder mobilizations Grade III-IV, Thoracic PA mobilizations Grade III-V, Nerve Glides - 10' 11/21     Modalities:   11/21    Charges:  Timed Code Treatment Minutes: 55'    Total Treatment Minutes: 5:15 - 6:15 (60')       [] EVAL (LOW) 15134 (typically 20 minutes face-to-face)  [] EVAL (MOD) 69545 (typically 30 minutes face-to-face)  [] EVAL (HIGH) 81116 (typically 45 minutes face-to-face)  [] RE-EVAL     [x] WO(65073) x  1   [] IONTO  [] NMR (80252) x      [] VASO  [x] Manual ([id number]) x  1    [] Other:  [x] TA x  2    [] Mech Traction (52217)  [] ES(attended) (67825)      [] ES (un) (59226):     GOALS: 11/8  Patient stated goal: lifting, tossing balls with kids, playing tennis and playing golf - Progressing towards    Therapist goals for Patient:   Short Term Goals: To be achieved in: 2 weeks  1. Independent in HEP and progression per patient tolerance, in order to prevent re-injury. - MET   2. Patient will have a decrease in pain to facilitate improvement in movement, function, and ADLs as indicated by Functional Deficits. - MET     Long Term Goals: To be achieved in: 4-6 weeks  1. Pt will have greater than or equal to 50% improvement on UEFI to assist with reaching prior level of function.  - MET   2. Patient will demonstrate increased right shoulder ROM to greater than or equal to 165 deg, ABD ROM to greater than or equal to 180 deg, ER ROM to greater than or equal to 85 deg, and IR ROM to greater than or equal to 60 deg to allow for proper joint functioning as indicated by patients Functional Deficits. - Progressing towards  3. Patient will demonstrate an increase in right shoulder (flex, ABD, IR, and ER) strength to 4+/5 to allow for proper functional mobility as indicated by patients Functional Deficits. - Progressing towards  4. Patient will return to ADLs without increased symptoms or restriction. - Progressing towards  5. Patient will return to tennis, golf, and throwing pain free. - Progressing towards    Progression Towards Functional goals:  [x] Patient is progressing as expected towards functional goals listed. [] Progression is slowed due to complexities listed. [] Progression has been slowed due to co-morbidities. [] Plan just implemented, too soon to assess goals progression  [] Other:     Next session:  Update HEP    ASSESSMENT: The patient tolerated theraband exercises altered into a position with 90 deg of abd. This was in an attempt to challenge dynamic strength. The patient was able to perform multiple high level strengthening and stabilization exercises. The patient showed interest in learning self mobilization techniques. 11/21    Treatment/Activity Tolerance:  [x] Patient tolerated treatment well [] Patient limited by fatique  [] Patient limited by pain  [] Patient limited by other medical complications  [] Other:     Patient education: Patient education on PT and plan of care including diagnosis, prognosis, treatment goals and options. Patient agrees with discussed POC and treatment and is aware of rehab process. Pt was also educated on clinic layout and use of modalities. 10/4    Prognosis: [x] Good [] Fair  [] Poor    Patient Requires Follow-up: [x] Yes  [] No    PLAN: 1x/2week for 4 - 6 weeks (11/8 - 12/20)  [] Continue per plan of care [x] Alter current plan (see comments)  [] Plan of care initiated [] Hold pending MD visit [] Discharge  Upon patient agreement, frequency of visits was altered to one time every two weeks    Electronically signed by: Lisa Sanchez PT, DPT    Kristin Pina, student physical therapist  Therapist was present, directed the patient's care, made skilled judgement, and was responsible for assessment and treatment of the patient.

## 2017-12-01 ENCOUNTER — HOSPITAL ENCOUNTER (OUTPATIENT)
Dept: PHYSICAL THERAPY | Age: 52
Discharge: HOME | End: 2017-12-31
Attending: ORTHOPAEDIC SURGERY | Admitting: ORTHOPAEDIC SURGERY

## 2017-12-13 ENCOUNTER — HOSPITAL ENCOUNTER (OUTPATIENT)
Dept: PHYSICAL THERAPY | Age: 52
Discharge: HOME | End: 2017-12-13
Admitting: ORTHOPAEDIC SURGERY

## 2017-12-13 NOTE — PLAN OF CARE
[facility], [address], [address] [facility]  Phone: [phone]   Fax:     [phone]    Physical Therapy Re-Certification Plan of Care    Dear  Dr. Edgar Lorenzana,    We had the pleasure of treating the following patient for physical therapy services at [address] [place] [address]. A summary of our findings can be found in the updated assessment below. This includes our plan of care. If you have any questions or concerns regarding these findings, please do not hesitate to contact me at the office phone number checked above. Thank you for the referral.     Physician Signature:________________________________Date:__________________  By signing above (or electronic signature), therapists plan is approved by physician      Overall Response to Treatment:   [x]Patient is responding well to treatment and improvement is noted with regards  to goals   []Patient should continue to improve in reasonable time if they continue HEP   []Patient has plateaued and is no longer responding to skilled PT intervention    []Patient is getting worse and would benefit from return to referring MD   []Patient unable to adhere to initial POC   [x]Other: The patient has improved his strength and functional ability, with minor improvements in range. His self-reported outcome measure has improved. The patient states he has begun throwing a ball and was evaluated for his ability to perform push-ups and was cleared to do them. He was provided with a new exercise routine that includes spinal mobility exercises as the patient was limited during the session. The patient was recommended to make a 2 week follow up appointment to assess his progress for possible discharge.     Date range of previous POC Visits: 17 - 17    Total Visits: 10          Physical Therapy Daily Treatment Note  Date:  2017    Patient Name:  Jalil Simpson    : Exercises/Interventions:   Exercises:  Exercise/Equipment Resistance/Repetitions Other comments   Stretching/PROM     Wand     Towel stretch 10 sec x 10  Added 10/12   Sleeper stretch 10 sec x 10  Added 10/4   Pulleys 10 sec x 10 flex Added 10/4   ER cane 10 sec x 10 supine Added 10/4   Thoracic extension 10 x 10 on stool Added 11/21   Isometrics     Retraction          Weight shift          PRE's     Flexion     Abduction     External Rotation     Internal Rotation     Shrugs     EXT     Reverse Flys     Serratus Horizontal Abd with ER     Biceps     Triceps     Retraction     Prone Ts ^11/8   Prone Ys ^11/8   Prone row+ER on T-ball  Added 10/19   Cable Column/Theraband     External Rotation ^11/21   ER OSI Added 11/21   Internal Rotation ^11/21   Shrugs    Lats    Ext ^11/21   Flex    Scapular Retraction ^11/21   BIC     TRIC     PNF     Postural education 10'    Dynamic Stability     Ball on the wall  Added 10/10   Body blade  Added 10/12   Single arm plank on table  Added 11/21   Thoracic sidebending 10 x 2 5, sec hold Added 12/13   Thoracic extension 10 x 2 5 ,sec hold Added 12/13   Thoracic rotation 10 x 2 5 ,sec hold Added 12/13     Therapeutic Exercise and NMR EXR  [x] (80710) Provided verbal/tactile cueing for activities related to strengthening, flexibility, endurance, ROM  for improvements in scapular, scapulothoracic and UE control with self care, reaching, carrying, lifting, house/yardwork, driving/computer work.    [] (19268) Provided verbal/tactile cueing for activities related to improving balance, coordination, kinesthetic sense, posture, motor skill, proprioception  to assist with  scapular, scapulothoracic and UE control with self care, reaching, carrying, lifting, house/yardwork, driving/computer work.     Therapeutic Activities:    [] (38272 or 60679) Provided verbal/tactile cueing for activities related to improving balance, coordination, kinesthetic sense, posture, motor skill, proprioception and motor activation to allow for proper function of scapular, scapulothoracic and UE control with self care, carrying, lifting, driving/computer work.      Home Exercise Program:    [x] (96286) Reviewed/Progressed HEP activities related to strengthening, flexibility, endurance, ROM of scapular, scapulothoracic and UE control with self care, reaching, carrying, lifting, house/yardwork, driving/computer work  [] (87045) Reviewed/Progressed HEP activities related to improving balance, coordination, kinesthetic sense, posture, motor skill, proprioception of scapular, scapulothoracic and UE control with self care, reaching, carrying, lifting, house/yardwork, driving/computer work      Manual Treatments:  PROM / STM / Oscillations-Mobs:  G-I, II, III, IV (PA's, Inf., Post.)  [x] (51885) Provided manual therapy to mobilize soft tissue/joints of cervical/CT, scapular GHJ and UE for the purpose of modulating pain, promoting relaxation,  increasing ROM, reducing/eliminating soft tissue swelling/inflammation/restriction, improving soft tissue extensibility and allowing for proper ROM for normal function with self care, reaching, carrying, lifting, house/yardwork, driving/computer work    Manual - shoulder flex, ABD, IR, and ER stretching, shoulder mobilizations Grade III-IV, Thoracic PA mobilizations Grade III-V, Cervical upglide grade II-III, cervicothoracic mobilization IV-V - 30' 12/13     Modalities:   11/21    Charges:  Timed Code Treatment Minutes: 55'    Total Treatment Minutes: 7:00 - 8:20 (80')       [] EVAL (LOW) 75366 (typically 20 minutes face-to-face)  [] EVAL (MOD) 70962 (typically 30 minutes face-to-face)  [] EVAL (HIGH) 02680 (typically 45 minutes face-to-face)  [] RE-EVAL     [x] LF(64667) x  1   [] IONTO  [] NMR (09246) x      [] VASO   [x] Manual (97648) x  2    [] Other:  [x] TA x  1    [] Mech Traction (34135)  [] ES(attended) (63894)      [] ES (un) (43587):     GOALS: 12/13  Patient stated routine that includes spinal mobility exercises as the patient was limited during the session. The patient was recommended to make a 2 week follow up appointment to assess his progress for possible discharge. 12/13    Treatment/Activity Tolerance:  [x] Patient tolerated treatment well [] Patient limited by fatique  [] Patient limited by pain  [] Patient limited by other medical complications  [] Other:     Patient education: Patient education on PT and plan of care including diagnosis, prognosis, treatment goals and options. Patient agrees with discussed POC and treatment and is aware of rehab process. Pt was also educated on clinic layout and use of modalities. 10/4    Prognosis: [x] Good [] Fair  [] Poor    Patient Requires Follow-up: [x] Yes  [] No    PLAN: 2 week follow up (12/13 - 12/28)  [] Continue per plan of care [x] Alter current plan (see comments)  [] Plan of care initiated [] Hold pending MD visit [] Discharge    Electronically signed by: Juliette Collins PT, DPT    Pteros Kohler, student physical therapist  Therapist was present, directed the patient's care, made skilled judgement, and was responsible for assessment and treatment of the patient.

## 2017-12-19 ENCOUNTER — OFFICE VISIT (OUTPATIENT)
Dept: ORTHOPEDIC SURGERY | Age: 52
End: 2017-12-19

## 2017-12-19 VITALS
HEIGHT: 67 IN | BODY MASS INDEX: 22.76 KG/M2 | DIASTOLIC BLOOD PRESSURE: 96 MMHG | HEART RATE: 57 BPM | WEIGHT: 145 LBS | SYSTOLIC BLOOD PRESSURE: 150 MMHG

## 2017-12-19 DIAGNOSIS — M75.41 SHOULDER IMPINGEMENT SYNDROME, RIGHT: Primary | ICD-10-CM

## 2017-12-19 DIAGNOSIS — S43.431A TEAR OF RIGHT GLENOID LABRUM, INITIAL ENCOUNTER: ICD-10-CM

## 2017-12-19 DIAGNOSIS — M75.01 ADHESIVE CAPSULITIS OF RIGHT SHOULDER: ICD-10-CM

## 2017-12-19 PROCEDURE — 99213 OFFICE O/P EST LOW 20 MIN: CPT | Performed by: ORTHOPAEDIC SURGERY

## 2017-12-19 NOTE — PROGRESS NOTES
Patient is a 59-year-old gentleman seen for follow-up evaluation his right shoulder. He reports that his pain is 95% improvement shoulder is 95% of normal.  Says not quite 100% because when he puts his arm behind his back and he leans on it he has some discomfort present on anterior aspect of his shoulder. And wants to make sure there is not anything else can be done. He's been doing his therapy and feels like he is continuing to see progress. He continues to take diclofenac is not reporting any side effects of medication. Pain Assessment  Location of Pain: Shoulder  Location Modifiers: Right  Severity of Pain: 6  Quality of Pain: Sharp  Frequency of Pain: Intermittent  Aggravating Factors:  (reaching behind back )  Limiting Behavior: No  Relieving Factors:  (stretching, straightening)  Work-Related Injury: No  Are there other pain locations you wish to document?: No    No past medical history on file. No past surgical history on file. No family history on file. Social History     Social History    Marital status: Unknown     Spouse name: N/A    Number of children: N/A    Years of education: N/A     Social History Main Topics    Smoking status: Never Smoker    Smokeless tobacco: Never Used    Alcohol use Yes    Drug use: No    Sexual activity: Not Asked     Other Topics Concern    None     Social History Narrative    None       Current Outpatient Prescriptions   Medication Sig Dispense Refill    diclofenac (VOLTAREN) 75 MG EC tablet Take 1 tablet by mouth 2 times daily 1 po bid prn pain 60 tablet 1     No current facility-administered medications for this visit. No Known Allergies    Vital signs:  BP (!) 150/96 Comment: pt was advised to see pcp  Pulse 57   Ht 5' 7\" (1.702 m)   Wt 145 lb (65.8 kg)   BMI 22.71 kg/m²        Neuro: Alert & oriented x 3,  normal,  no focal deficits noted. Normal affect.   Eyes: sclera clear  Ears: Normal external ear  Mouth:  No perioral lesions  Pulm: Respirations unlabored and regular  Pulse: Regular rate and rhythm   Skin: Warm, well perfused          Left Shoulder Examination:    Inspection: No abnormal swelling. No erythema. No induration. Palpation: No tenderness to palpation. No palpable masses. No crepitus. Acromioclavicular joint: Nontender to palpation. Range of Motion: Full range of motion. Strength: Normal rotator cuff strength. Normal scapulothoracic rhythm. Instability: No anterior or posterior instability. Special Tests: Negative biceps findings. Negative Neer and Juwan signs. Negative apprehension sign. Negative Fairbank sign. Negative thrower's sign. Neurovascular: Skin warm well perfused. Normal sensation to light touch. Right Shoulder Examination:    Inspection: No abnormal swelling. No erythema. No induration. Palpation: No tenderness to palpation. No palpable masses. No crepitus. Acromioclavicular joint: Nontender to palpation. Range of Motion: Mild limitation of internal rotation, approximately 25° short of the opposite side. Strength: Normal rotator cuff strength. Normal scapulothoracic rhythm. Instability: No anterior or posterior instability. Special Tests: Negative biceps findings. Negative Neer and Juwan signs. Negative apprehension sign. Negative Fairbank sign. Negative thrower's sign. Neurovascular: Skin warm well perfused. Normal sensation to light touch. Impression: Glenohumeral internal rotation deficit secondary posterior capsule tightness of mild rotator cuff tendinitis. Patient is seen significant progress in therapy and reports that he is 95% of normal.  I think that he will continue to see progress with some additional posterior capsule stretching exercises. I went over these with him. He does not use steroid injection is certainly doesn't need any surgery at this time.   His MRI showed a small posterior superior labral tear but this is not the cause of his current symptoms and I don't think requires any type of the surgery. He's go see me back as symptoms fail to improve. We have are long conversation regarding the etiology of his symptoms as well as treatment options and what could be expected from a surgical procedure and went such a procedure would be indicated. Greater than 50% of the visit was spent counseling the patient. I personally reviewed the patient's pain scale, review of systems, family history, social history, past medical history, allergies and medications. 13 point review of systems was collected today and is included in the medical record. Reji Price MD  Sports Medicine, Knee and Shoulder Surgery    This dictation was performed with a verbal recognition program [facility]) and it was checked for errors. It is possible that there are still dictated errors within this office note. If so, please bring any errors to my attention for an addendum. All efforts were made to ensure that this office note is accurate.

## 2018-01-01 ENCOUNTER — HOSPITAL ENCOUNTER (OUTPATIENT)
Dept: PHYSICAL THERAPY | Age: 53
Discharge: HOME | End: 2018-01-31
Attending: ORTHOPAEDIC SURGERY | Admitting: ORTHOPAEDIC SURGERY